# Patient Record
Sex: FEMALE | Race: WHITE | NOT HISPANIC OR LATINO | Employment: FULL TIME | URBAN - METROPOLITAN AREA
[De-identification: names, ages, dates, MRNs, and addresses within clinical notes are randomized per-mention and may not be internally consistent; named-entity substitution may affect disease eponyms.]

---

## 2023-12-19 ENCOUNTER — TELEPHONE (OUTPATIENT)
Dept: OBSTETRICS AND GYNECOLOGY | Facility: CLINIC | Age: 23
End: 2023-12-19
Payer: COMMERCIAL

## 2023-12-19 ENCOUNTER — OFFICE VISIT (OUTPATIENT)
Dept: OBSTETRICS AND GYNECOLOGY | Facility: CLINIC | Age: 23
End: 2023-12-19
Payer: COMMERCIAL

## 2023-12-19 VITALS
HEIGHT: 62 IN | SYSTOLIC BLOOD PRESSURE: 115 MMHG | WEIGHT: 211 LBS | DIASTOLIC BLOOD PRESSURE: 82 MMHG | BODY MASS INDEX: 38.83 KG/M2

## 2023-12-19 DIAGNOSIS — Z72.0 TOBACCO USE: ICD-10-CM

## 2023-12-19 DIAGNOSIS — Z01.419 VISIT FOR GYNECOLOGIC EXAMINATION: ICD-10-CM

## 2023-12-19 DIAGNOSIS — Z31.69 ENCOUNTER FOR PRECONCEPTION CONSULTATION: ICD-10-CM

## 2023-12-19 DIAGNOSIS — N91.4 SECONDARY OLIGOMENORRHEA: Primary | ICD-10-CM

## 2023-12-19 LAB
ALBUMIN SERPL-MCNC: 4.6 G/DL (ref 3.5–5.2)
ALBUMIN/GLOB SERPL: 1.8 G/DL
ALP SERPL-CCNC: 73 U/L (ref 39–117)
ALT SERPL-CCNC: 36 U/L (ref 1–33)
AST SERPL-CCNC: 18 U/L (ref 1–32)
BILIRUB SERPL-MCNC: <0.2 MG/DL (ref 0–1.2)
BUN SERPL-MCNC: 13 MG/DL (ref 6–20)
BUN/CREAT SERPL: 16.7 (ref 7–25)
CALCIUM SERPL-MCNC: 9.9 MG/DL (ref 8.6–10.5)
CHLORIDE SERPL-SCNC: 104 MMOL/L (ref 98–107)
CO2 SERPL-SCNC: 21.8 MMOL/L (ref 22–29)
CREAT SERPL-MCNC: 0.78 MG/DL (ref 0.57–1)
EGFRCR SERPLBLD CKD-EPI 2021: 109.6 ML/MIN/1.73
ERYTHROCYTE [DISTWIDTH] IN BLOOD BY AUTOMATED COUNT: 13 % (ref 12.3–15.4)
GLOBULIN SER CALC-MCNC: 2.6 GM/DL
GLUCOSE SERPL-MCNC: 98 MG/DL (ref 65–99)
HCT VFR BLD AUTO: 44.1 % (ref 34–46.6)
HGB BLD-MCNC: 14.3 G/DL (ref 12–15.9)
MCH RBC QN AUTO: 26.5 PG (ref 26.6–33)
MCHC RBC AUTO-ENTMCNC: 32.4 G/DL (ref 31.5–35.7)
MCV RBC AUTO: 81.7 FL (ref 79–97)
PLATELET # BLD AUTO: 291 10*3/MM3 (ref 140–450)
POTASSIUM SERPL-SCNC: 4.3 MMOL/L (ref 3.5–5.2)
PROT SERPL-MCNC: 7.2 G/DL (ref 6–8.5)
RBC # BLD AUTO: 5.4 10*6/MM3 (ref 3.77–5.28)
SODIUM SERPL-SCNC: 137 MMOL/L (ref 136–145)
TSH SERPL DL<=0.005 MIU/L-ACNC: 1.6 UIU/ML (ref 0.27–4.2)
WBC # BLD AUTO: 8.3 10*3/MM3 (ref 3.4–10.8)

## 2023-12-19 NOTE — TELEPHONE ENCOUNTER
Caller: Irina Moran    Relationship to patient: Self    Best call back number: [phone]    Patient is needing: PT WAS SEEN IN OFFICE TODAY FOR NEW GYN APPT. PT WAS TOLD SHE WOULD NEED TO MAKE A U/S APPT AND FOLLOW UP APPT. PT FORGOT TO SCHEDULE BEFORE LEAVING THE OFFICE.     PER VICKI SEND TE TO OFFICE TO SEE HOW SOON DR. LUCIO WOULD LIKE FOR PATIENT TO COME BACK

## 2023-12-19 NOTE — PROGRESS NOTES
[facility]  [address], [address]  [place], [place] [address]  Phone: [phone] / Fax:  [phone]      12/19/2023    [address] [facility] [address]    Provider, No Known    Chief Complaint   Patient presents with    Gynecologic Exam     NP Annual Exam last pap 3 years, she reports this to be abnormal. Patient is concerned with being able to conceive. She believes she was pregnant last year, she had taken several ucgs that was positive, then began bleeding, by time she made it to the ER for bleeding a ultrasound showed no IUP. Patient declines STD screening.       Irina Moran is here for annual gynecologic exam.  HPI - Patient with pap approximately 3 years ago and reports that it was abnormal but results not available.  She is wanting to conceive and has been trying to do so for 2 years.  She had possible early miscarriage last year; she had home testing that was positive but subsequently developed bleeding and went to ED and no evidence of pregnancy was found.  She does not have regular cycles and reports extended intervals between her periods.    Past Medical History:   Diagnosis Date    Abnormal Pap smear of cervix     Anxiety     Bipolar disorder     Depression     Polycystic ovary syndrome        Past Surgical History:   Procedure Laterality Date    TOE SURGERY Bilateral     Pinky toes- extra bones       No Known Allergies    Social History     Socioeconomic History    Marital status:    Tobacco Use    Smoking status: Former     Types: Cigarettes   Vaping Use    Vaping Use: Every day    Substances: Flavoring    Devices: Disposable   Substance and Sexual Activity    Alcohol use: Not Currently    Drug use: Not Currently     Types: Marijuana    Sexual activity: Yes     Birth control/protection: None       Family History   Problem Relation Age of Onset    No Known Problems Father     Breast cancer Mother     Cervical cancer Mother     Aneurysm Brother     Other Sister     No  "Known Problems Sister     Other Paternal Grandfather     Ovarian cancer Paternal Grandmother     Breast cancer Maternal Grandmother     Lung cancer Maternal Grandmother     Other Maternal Grandfather        Patient's last menstrual period was 2023 (approximate).    OB History          1    Para        Term                AB   1    Living             SAB   1    IAB        Ectopic        Molar        Multiple        Live Births                    Vitals:    23 1104   BP: 115/82   Weight: 95.7 kg (211 lb)   Height: 156.2 cm (61.5\")       Physical Exam  Constitutional:       Appearance: Normal appearance. She is well-developed.   Genitourinary:      Bladder and urethral meatus normal.      Right Labia: No tenderness or lesions.     Left Labia: No tenderness or lesions.     No vaginal discharge or tenderness.        Right Adnexa: not tender and not full.     Left Adnexa: not tender and not full.     No cervical motion tenderness or lesion.      Uterus is not enlarged or tender.      No urethral tenderness or hypermobility present.   Breasts:     Right: No mass or nipple discharge.      Left: No mass or nipple discharge.   HENT:      Right Ear: External ear normal.      Left Ear: External ear normal.      Nose: Nose normal.   Eyes:      Conjunctiva/sclera: Conjunctivae normal.   Neck:      Thyroid: No thyromegaly.   Cardiovascular:      Rate and Rhythm: Normal rate and regular rhythm.      Heart sounds: Normal heart sounds.   Pulmonary:      Effort: Pulmonary effort is normal.      Breath sounds: No stridor. No wheezing.   Abdominal:      Palpations: Abdomen is soft.      Tenderness: There is no abdominal tenderness. There is no guarding or rebound.   Musculoskeletal:         General: Normal range of motion.      Cervical back: Normal range of motion and neck supple.   Neurological:      Mental Status: She is alert.      Coordination: Coordination normal.   Skin:     General: Skin is warm and " dry.   Psychiatric:         Mood and Affect: Mood normal.         Behavior: Behavior normal.         Thought Content: Thought content normal.         Judgment: Judgment normal.   Vitals reviewed. Exam conducted with a chaperone present.         Diagnoses and all orders for this visit:    1. Secondary oligomenorrhea (Primary)  -     Follicle Stimulating Hormone  -     TSH  -     Luteinizing Hormone  -     Prolactin  -     CBC (No Diff)  -     Comprehensive Metabolic Panel  -     Check labs to assess for oligomenorrhea.  Patient to follow up and do ultrasound.    2. Encounter for preconception consultation        -     Discussed optimizing health, taking folate/vitamin, quitting smoking.    3. Tobacco use        -      Discussed importance of quitting smoking and risks of continued smoking.    4. Visit for gynecologic examination  -     IGP, Rfx Aptima HPV ASCU  -     Discussed health maintenance and breast awareness.        Grover Islas MD

## 2023-12-19 NOTE — TELEPHONE ENCOUNTER
Patient called after appt today and wants to know how soon you would like for her to get her US?  Ty

## 2023-12-20 LAB
FSH SERPL-ACNC: 6.2 MIU/ML
LH SERPL-ACNC: 20.6 MIU/ML
PROLACTIN SERPL-MCNC: 6.5 NG/ML (ref 4.8–33.4)

## 2023-12-21 LAB
A VAGINAE DNA VAG QL NAA+PROBE: NORMAL SCORE
BVAB2 DNA VAG QL NAA+PROBE: NORMAL SCORE
C ALBICANS DNA VAG QL NAA+PROBE: NEGATIVE
C GLABRATA DNA VAG QL NAA+PROBE: NEGATIVE
C TRACH DNA VAG QL NAA+PROBE: NEGATIVE
CONV .: NORMAL
CYTOLOGIST CVX/VAG CYTO: NORMAL
CYTOLOGY CVX/VAG DOC CYTO: NORMAL
CYTOLOGY CVX/VAG DOC THIN PREP: NORMAL
DX ICD CODE: NORMAL
HIV 1 & 2 AB SER-IMP: NORMAL
MEGA1 DNA VAG QL NAA+PROBE: NORMAL SCORE
N GONORRHOEA DNA VAG QL NAA+PROBE: NEGATIVE
OTHER STN SPEC: NORMAL
STAT OF ADQ CVX/VAG CYTO-IMP: NORMAL
T VAGINALIS DNA VAG QL NAA+PROBE: NEGATIVE

## 2024-01-23 ENCOUNTER — OFFICE VISIT (OUTPATIENT)
Dept: OBSTETRICS AND GYNECOLOGY | Facility: CLINIC | Age: 24
End: 2024-01-23
Payer: COMMERCIAL

## 2024-01-23 VITALS
SYSTOLIC BLOOD PRESSURE: 111 MMHG | HEIGHT: 61 IN | BODY MASS INDEX: 40.78 KG/M2 | WEIGHT: 216 LBS | DIASTOLIC BLOOD PRESSURE: 76 MMHG

## 2024-01-23 DIAGNOSIS — N91.4 SECONDARY OLIGOMENORRHEA: Primary | ICD-10-CM

## 2024-01-23 PROCEDURE — 99213 OFFICE O/P EST LOW 20 MIN: CPT | Performed by: OBSTETRICS & GYNECOLOGY

## 2024-01-23 NOTE — PROGRESS NOTES
Subjective   Irina Moran is a 23 y.o. female.     Cc:  Conception, intermittent periods    History of Present Illness - Patient is a 23 year old nulliparous female who presents for follow up.  She has sporadic cycles but last menses was within past 2 weeks.  She underwent Prolactin, TSH, FSH, LH and ultrasound that all were normal.  Her partner has not been tested for fertility and she has never tried medication.    The following portions of the patient's history were reviewed and updated as appropriate: She  has a past medical history of Abnormal Pap smear of cervix, Anxiety, Bipolar disorder, Depression, and Polycystic ovary syndrome.  She  reports that she has quit smoking. Her smoking use included cigarettes. She does not have any smokeless tobacco history on file. She reports that she does not currently use alcohol. She reports that she does not currently use drugs after having used the following drugs: Marijuana.  Current Outpatient Medications   Medication Sig Dispense Refill    metFORMIN (GLUCOPHAGE) 500 MG tablet Take 1 tablet by mouth 2 (Two) Times a Day With Meals.      sertraline (ZOLOFT) 50 MG tablet 1 tablet Daily.       No current facility-administered medications for this visit.     She has No Known Allergies..    Review of Systems   Constitutional:  Negative for chills and fever.   Genitourinary:  Positive for menstrual problem.       Objective   Physical Exam  Vitals reviewed.   Constitutional:       Appearance: Normal appearance.   Neurological:      Mental Status: She is alert.   Psychiatric:         Mood and Affect: Mood normal.         Behavior: Behavior normal.         Thought Content: Thought content normal.         Judgment: Judgment normal.         Assessment & Plan   Diagnoses and all orders for this visit:    1. Secondary oligomenorrhea (Primary)  - Recommend fertility evaluation.  Patient to start prenatal vitamin.  Consider male factors.  Questions answered.    Grover Islas MD

## 2024-01-26 ENCOUNTER — TELEPHONE (OUTPATIENT)
Dept: OBSTETRICS AND GYNECOLOGY | Facility: CLINIC | Age: 24
End: 2024-01-26

## 2024-01-26 NOTE — TELEPHONE ENCOUNTER
PROVIDER: DR. LUCIO    Caller: Irina Moran    Relationship: Self    Best call back number: [phone] ANYTIME, CAN LVM IF NA    What is the medical concern/diagnosis: FERTILITY    What specialty or service is being requested: FERTILITY    What is the provider, practice or medical service name: DR. FLORES @ [facility]    What is the office location: [address] [place] [address] [address] [place] [address]    What is the office phone number: [phone]    Any additional details: PT WAS IN ON 1-23 DISCUSSED ISSUE WITH DR. LUCIO, STATES SHE NEEDS A REFERRAL TO [facility]

## 2024-03-01 ENCOUNTER — TELEPHONE (OUTPATIENT)
Dept: OBSTETRICS AND GYNECOLOGY | Facility: CLINIC | Age: 24
End: 2024-03-01
Payer: COMMERCIAL

## 2024-03-01 NOTE — TELEPHONE ENCOUNTER
I tried calling the patient to discuss. However, I do not see where she had a DOS on 2-8-24 with Dr. Islas, her last visit with him was 1-23-24. She had a visit on 2-7-24 ([facility]) and 2-9-24 ( [facility]), neither of these are visits were with Dr. Islas. She may contact the number below to discuss.    Billing office number is [phone].      Thanks,   Paola

## 2024-03-01 NOTE — TELEPHONE ENCOUNTER
Caller: Irina Moran    Relationship: Self    Best call back number: [phone]    What does billing need from the patient: PT RECEIVED A BILL FOR DOS 2/8/24. PT IS ASKING FOR A CALL BACK TO GO OVER BILL. PT STATES THERE ISN'T A BILLING NUMBER ON BILL TO CALL FOR ADDITIONAL QUESTIONS. ASKING FOR CALL BACK OFFICE

## 2024-04-24 ENCOUNTER — OFFICE VISIT (OUTPATIENT)
Dept: CARDIOLOGY | Facility: CLINIC | Age: 24
End: 2024-04-24
Payer: COMMERCIAL

## 2024-04-24 VITALS
WEIGHT: 213.6 LBS | DIASTOLIC BLOOD PRESSURE: 80 MMHG | HEART RATE: 69 BPM | BODY MASS INDEX: 39.31 KG/M2 | HEIGHT: 62 IN | SYSTOLIC BLOOD PRESSURE: 138 MMHG

## 2024-04-24 DIAGNOSIS — I10 PRIMARY HYPERTENSION: Primary | ICD-10-CM

## 2024-04-25 NOTE — PROGRESS NOTES
"      CARDIOLOGY    Nasir Paniagua MD    ENCOUNTER DATE:  04/24/2024    Irina Moran / 24 y.o. / female        CHIEF COMPLAINT / REASON FOR OFFICE VISIT     Dizziness  Hypertension    HISTORY OF PRESENT ILLNESS       Dizziness    Irina Moran is a 24 y.o. female who presents today for consultation.  Patient's been having episodes of intermittent dizziness and just feeling poorly.  Patient says that when she is sleeping or resting she will feel her heart skip.  She is getting short of breath both at rest and with exertion.  She says at times she feels bad feels dizzy feels some pain in her head.  During these episodes her blood pressure is 154 systolically.  When her blood pressure gets this high she also gets tired and nauseated with it.  Patient has gained 70 pounds in the past 3 years.  She said in the past year she is snored very extensively.      The following portions of the patient's history were reviewed and updated as appropriate: allergies, current medications, past family history, past medical history, past social history, past surgical history and problem list.      VITAL SIGNS     Visit Vitals  /80 (BP Location: Left arm)   Pulse 69   Ht 156.5 cm (61.6\")   Wt 96.9 kg (213 lb 9.6 oz)   BMI 39.58 kg/m²         Wt Readings from Last 3 Encounters:   04/24/24 96.9 kg (213 lb 9.6 oz)   02/09/24 98 kg (216 lb)   01/23/24 98 kg (216 lb)     Body mass index is 39.58 kg/m².      REVIEW OF SYSTEMS   Review of Systems   Neurological:  Positive for dizziness.           PHYSICAL EXAMINATION     Vitals reviewed.   Constitutional:       Appearance: Healthy appearance.   Pulmonary:      Effort: Pulmonary effort is normal.   Cardiovascular:      Normal rate. Regular rhythm. Normal S1. Normal S2.       Murmurs: There is no murmur.      No gallop.  No click. No rub.   Pulses:     Intact distal pulses.   Edema:     Peripheral edema absent.   Neurological:      Mental Status: Alert.         REVIEWED DATA "       ECG 12 Lead    Date/Time: 4/24/2024 9:14 AM  Performed by: Nasir Paniagua MD    Authorized by: Nasir Paniagua MD  Comparison: compared with previous ECG from 4/10/2024  Similar to previous ECG  Rhythm: sinus rhythm    Clinical impression: normal ECG        Cardiac Procedures:            ASSESSMENT & PLAN      Diagnosis Plan   1. Primary hypertension  Ambulatory Referral to Sleep Medicine            SUMMARY/DISCUSSION  Hypertension.  Patient blood pressure goes up in the 150s and she feels poorly.  I believe this is because she is a young female and her blood pressure should usually run about 1 10-1 15 so this blood pressure for her is very high.  She said that before her rapid weight gain she would run in this range.  She is also snoring and describes sleep apnea quite classically.  At this point the best thing for her to do is to make lifestyle changes.  1 she needs to avoid salt intake to she needs to start trying to exercise and lose weight and 3 I am going to refer her to the sleep clinic for potential treatment of sleep apnea which could be the underlying cause of why she is feeling so poorly.  Will reevaluate in 6 weeks and go from there.  Patient does vape clearly this is not helping the situation.        MEDICATIONS         Discharge Medications            Accurate as of April 24, 2024 11:59 PM. If you have any questions, ask your nurse or doctor.                Continue These Medications        Instructions Start Date   azithromycin 250 MG tablet  Commonly known as: Zithromax Z-Esteban   Take 2 tablets by mouth on day 1, then 1 tablet daily on days 2-5      fluticasone 50 MCG/ACT nasal spray  Commonly known as: FLONASE   2 sprays, Nasal, Daily      metFORMIN 500 MG tablet  Commonly known as: GLUCOPHAGE   500 mg, Oral, 2 Times Daily With Meals      sertraline 50 MG tablet  Commonly known as: ZOLOFT   1 tablet, Daily             Stop These Medications      methylPREDNISolone 4 MG dose  pack  Commonly known as: MEDROL  Stopped by: Nasir Paniagua MD                  **Dragon Disclaimer:   Much of this encounter note is an electronic transcription/translation of spoken language to printed text. The electronic translation of spoken language may permit erroneous, or at times, nonsensical words or phrases to be inadvertently transcribed. Although I have reviewed the note for such errors, some may still exist.

## 2024-07-17 ENCOUNTER — TELEPHONE (OUTPATIENT)
Dept: CARDIOLOGY | Facility: CLINIC | Age: 24
End: 2024-07-17
Payer: COMMERCIAL

## 2024-07-17 ENCOUNTER — OFFICE VISIT (OUTPATIENT)
Dept: CARDIOLOGY | Facility: CLINIC | Age: 24
End: 2024-07-17
Payer: COMMERCIAL

## 2024-07-17 VITALS
HEIGHT: 62 IN | DIASTOLIC BLOOD PRESSURE: 90 MMHG | SYSTOLIC BLOOD PRESSURE: 130 MMHG | BODY MASS INDEX: 40.48 KG/M2 | HEART RATE: 89 BPM | OXYGEN SATURATION: 97 % | WEIGHT: 220 LBS

## 2024-07-17 DIAGNOSIS — I10 ESSENTIAL HYPERTENSION: Primary | ICD-10-CM

## 2024-07-17 RX ORDER — LABETALOL 100 MG/1
50 TABLET, FILM COATED ORAL 2 TIMES DAILY
Start: 2024-07-17

## 2024-07-17 NOTE — PROGRESS NOTES
"      CARDIOLOGY    Nasir Paniagua MD    ENCOUNTER DATE:  07/17/2024    Irina Moran / 24 y.o. / female        CHIEF COMPLAINT / REASON FOR OFFICE VISIT     Primary hypertension (04/24/2024 Follow up)      HISTORY OF PRESENT ILLNESS       HPI  Irina Moran is a 24 y.o. female who presents today for reevaluation.  Patient unfortunately still felt poorly.  Her PMD put her on labetalol 100 mg twice daily.  Her weight is up a little bit although she says overall she is feeling better.  She is not feeling her heart pounding as bad and her blood pressure has improved.  She denies any types of chest pain shortness of breath palpitations lightheadedness swelling or fatigue.      The following portions of the patient's history were reviewed and updated as appropriate: allergies, current medications, past family history, past medical history, past social history, past surgical history and problem list.      VITAL SIGNS     Visit Vitals  /90 (BP Location: Left arm)   Pulse 89   Ht 156.5 cm (61.6\")   Wt 99.8 kg (220 lb)   SpO2 97%   BMI 40.76 kg/m²         Wt Readings from Last 3 Encounters:   07/17/24 99.8 kg (220 lb)   04/24/24 96.9 kg (213 lb 9.6 oz)   02/09/24 98 kg (216 lb)     Body mass index is 40.76 kg/m².      REVIEW OF SYSTEMS   ROS        PHYSICAL EXAMINATION     Vitals reviewed.   Constitutional:       Appearance: Healthy appearance.   Pulmonary:      Effort: Pulmonary effort is normal.   Cardiovascular:      Normal rate. Regular rhythm. Normal S1. Normal S2.       Murmurs: There is no murmur.      No gallop.  No click. No rub.   Pulses:     Intact distal pulses.   Edema:     Peripheral edema absent.   Neurological:      Mental Status: Alert.           REVIEWED DATA     Procedures    Cardiac Procedures:            ASSESSMENT & PLAN      Diagnosis Plan   1. Essential hypertension              SUMMARY/DISCUSSION  Hypertension.  Patient's blood pressure is better controlled and clinically she is doing much " better.  Unfortunately she had a rapid weight gain and I think it may get her lose some weight it would really significantly help her.  I am concerned about the labetalol because it frequently put weight on people.  I told her to decrease it down to 50 twice daily follow her blood pressure and see how she does.  If her blood pressure does not improve and she feels a pounding again then she will have to go back up to the 100 twice daily.  She was set up for sleep study but it got postponed.  Will see how she does and follow back up with her in 3 months.  Clearly she is making some progress.        MEDICATIONS         Discharge Medications            Accurate as of July 17, 2024  3:13 PM. If you have any questions, ask your nurse or doctor.                New Medications        Instructions Start Date   labetalol 100 MG tablet  Commonly known as: NORMODYNE  Started by: Nicolasa Lang RN   50 mg, Oral, 2 Times Daily             Continue These Medications        Instructions Start Date   metFORMIN 500 MG tablet  Commonly known as: GLUCOPHAGE   500 mg, Oral, 2 Times Daily With Meals      sertraline 50 MG tablet  Commonly known as: ZOLOFT   1 tablet, Daily                   **Dragon Disclaimer:   Much of this encounter note is an electronic transcription/translation of spoken language to printed text. The electronic translation of spoken language may permit erroneous, or at times, nonsensical words or phrases to be inadvertently transcribed. Although I have reviewed the note for such errors, some may still exist.

## 2024-07-17 NOTE — TELEPHONE ENCOUNTER
Patient said you requested for her to call and let you know what BP med she is taking.    Patient reports she is taking labetalol 100 mg BID    I can add that to her list if you want?  Not sure if you are planning to add or change anything.     Just let me know and I can call her back.    Nicolasa Lang RN  [facility] Triage  07/17/24 10:54 EDT

## 2024-07-17 NOTE — ADDENDUM NOTE
Addended by: CHACORTA SIMS on: 7/17/2024 01:36 PM     Modules accepted: Orders     Don't start fosamax until her dentist gives her the clear. She does not need to hold anything.

## 2024-07-17 NOTE — TELEPHONE ENCOUNTER
Attempted to call Irina Moran, no answer.  Left a voicemail for patient to call back and ask to speak with the triage nurses.  Will continue to try to reach patient.    Triage: please pass along to patient that Dr. Paniagua wants her on 0.5 tablet of labetolol BID.  I have already updated the medlist.    Nicolasa Lang RN  [facility] Triage  07/17/24 13:14 EDT

## 2024-07-18 NOTE — TELEPHONE ENCOUNTER
I was able to reach patient and have her decrease her labetalol to 50 mg BID, which means she will take half a pill twice daily.  She verbalizes understanding and agrees with plan.    Nicolasa Lang RN  [facility] Triage  07/18/24 10:44 EDT

## 2024-08-21 ENCOUNTER — OFFICE VISIT (OUTPATIENT)
Dept: SLEEP MEDICINE | Facility: HOSPITAL | Age: 24
End: 2024-08-21
Payer: COMMERCIAL

## 2024-08-21 VITALS — WEIGHT: 213 LBS | BODY MASS INDEX: 39.2 KG/M2 | HEART RATE: 68 BPM | OXYGEN SATURATION: 94 % | HEIGHT: 62 IN

## 2024-08-21 DIAGNOSIS — G47.10 HYPERSOMNIA: ICD-10-CM

## 2024-08-21 DIAGNOSIS — E66.01 MORBID OBESITY: ICD-10-CM

## 2024-08-21 DIAGNOSIS — G47.8 NON-RESTORATIVE SLEEP: ICD-10-CM

## 2024-08-21 DIAGNOSIS — G47.63 SLEEP-RELATED BRUXISM: ICD-10-CM

## 2024-08-21 DIAGNOSIS — R06.83 SNORING: ICD-10-CM

## 2024-08-21 DIAGNOSIS — G47.30 SLEEP APNEA, UNSPECIFIED TYPE: Primary | ICD-10-CM

## 2024-08-21 PROCEDURE — 99204 OFFICE O/P NEW MOD 45 MIN: CPT | Performed by: FAMILY MEDICINE

## 2024-08-21 PROCEDURE — G0463 HOSPITAL OUTPT CLINIC VISIT: HCPCS
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
## 2024-09-03 ENCOUNTER — HOSPITAL ENCOUNTER (OUTPATIENT)
Dept: SLEEP MEDICINE | Facility: HOSPITAL | Age: 24
Discharge: HOME | End: 2024-09-03
Admitting: FAMILY MEDICINE
Payer: COMMERCIAL

## 2024-09-03 DIAGNOSIS — G47.63 SLEEP-RELATED BRUXISM: ICD-10-CM

## 2024-09-03 DIAGNOSIS — E66.01 MORBID OBESITY: ICD-10-CM

## 2024-09-03 DIAGNOSIS — G47.8 NON-RESTORATIVE SLEEP: ICD-10-CM

## 2024-09-03 DIAGNOSIS — G47.10 HYPERSOMNIA: ICD-10-CM

## 2024-09-03 DIAGNOSIS — R06.83 SNORING: ICD-10-CM

## 2024-09-03 DIAGNOSIS — G47.30 SLEEP APNEA, UNSPECIFIED TYPE: ICD-10-CM

## 2024-09-03 PROCEDURE — 95806 SLEEP STUDY UNATT&RESP EFFT: CPT

## 2024-09-06 ENCOUNTER — TELEPHONE (OUTPATIENT)
Dept: SLEEP MEDICINE | Facility: HOSPITAL | Age: 24
End: 2024-09-06
Payer: COMMERCIAL

## 2024-09-06 PROCEDURE — 95806 SLEEP STUDY UNATT&RESP EFFT: CPT | Performed by: FAMILY MEDICINE

## 2024-09-27 ENCOUNTER — OFFICE VISIT (OUTPATIENT)
Dept: SLEEP MEDICINE | Facility: HOSPITAL | Age: 24
End: 2024-09-27
Payer: COMMERCIAL

## 2024-09-27 VITALS — WEIGHT: 215 LBS | OXYGEN SATURATION: 98 % | BODY MASS INDEX: 40.59 KG/M2 | HEIGHT: 61 IN | HEART RATE: 70 BPM

## 2024-09-27 DIAGNOSIS — G47.33 OBSTRUCTIVE SLEEP APNEA: Primary | ICD-10-CM

## 2024-09-27 DIAGNOSIS — E66.01 MORBID OBESITY: ICD-10-CM

## 2024-09-27 PROCEDURE — G0463 HOSPITAL OUTPT CLINIC VISIT: HCPCS

## 2024-09-30 ENCOUNTER — TELEPHONE (OUTPATIENT)
Dept: SLEEP MEDICINE | Facility: HOSPITAL | Age: 24
End: 2024-09-30
Payer: COMMERCIAL

## 2024-12-13 ENCOUNTER — TELEPHONE (OUTPATIENT)
Dept: SLEEP MEDICINE | Facility: HOSPITAL | Age: 24
End: 2024-12-13
Payer: COMMERCIAL

## 2024-12-13 ENCOUNTER — OFFICE VISIT (OUTPATIENT)
Dept: SLEEP MEDICINE | Facility: HOSPITAL | Age: 24
End: 2024-12-13
Payer: COMMERCIAL

## 2024-12-13 VITALS — WEIGHT: 220 LBS | HEIGHT: 60 IN | BODY MASS INDEX: 43.19 KG/M2 | OXYGEN SATURATION: 97 % | HEART RATE: 94 BPM

## 2024-12-13 DIAGNOSIS — G47.33 OBSTRUCTIVE SLEEP APNEA: Primary | ICD-10-CM

## 2024-12-13 DIAGNOSIS — E66.01 MORBID OBESITY: ICD-10-CM

## 2024-12-13 PROCEDURE — G0463 HOSPITAL OUTPT CLINIC VISIT: HCPCS

## 2024-12-13 NOTE — PROGRESS NOTES
"Follow Up [facility] Note     Chief Complaint:  MALIA     Primary Care Physician: Shira Maloney APRN    Interval History:   The patient is a 24 y.o. female  who was last seen in the sleep lab: 9/27/2024.  September 2024 HST showed AHI 5.9 lowest SpO2 84% average SpO2 96%.  At last visit we discussed results and treatment options.  Patient was amenable to trying auto CPAP 8-16 cm H2O.  Presents today for first follow-up since starting PAP.  Feels like is not getting enough air.    Downloaded PAP Data Reviewed For Compliance:  DME is [facility] [place].  Downloads between 11/12/2024 - 12/11/2024.  Average usage is 4 hours 3 minutes.  Average AHI is 0.7.  Average auto CPAP pressure is 9.2 cm H2O    I have reviewed the above results and compared them with the patient's last downloads and reviewed with the patient.    Review of Systems:    A complete review of systems was done and all were negative with the exception of see scanned media    Social History:    Social History     Socioeconomic History    Marital status:    Tobacco Use    Smoking status: Every Day     Passive exposure: Past    Smokeless tobacco: Never   Vaping Use    Vaping status: Every Day    Substances: Flavoring    Devices: Disposable    Passive vaping exposure: Yes   Substance and Sexual Activity    Alcohol use: Not Currently    Drug use: Not Currently     Types: Marijuana    Sexual activity: Yes     Birth control/protection: None       Allergies:  Patient has no known allergies.     Medication Review:  Reviewed.      Vital Signs:    Vitals:    12/13/24 1104   Pulse: 94   SpO2: 97%   Weight: 99.8 kg (220 lb)   Height: 152.4 cm (60\")     Body mass index is 42.97 kg/m².    Physical Exam:    Constitutional:  Well developed 24 y.o. female that appears in no apparent distress.  Awake & oriented times 3.  Normal mood with normal recent and remote memory and normal judgement.  Eyes:  Conjunctivae normal.  Oropharynx: Previously, moist mucous " membranes.    Self-administered [place] Sleepiness Scale test results: See scanned media  0-5 Lower normal daytime sleepiness  6-10 Higher normal daytime sleepiness  11-12 Mild, 13-15 Moderate, & 16-24 Severe excessive daytime sleepiness    Impression:   1. Obstructive sleep apnea    2. Morbid obesity        Obstructive sleep apnea adequately treated with auto CPAP 8-16 cm H2O. The patient appears to be at goal with good compliance and usage.  Pressure comfort issues.    Plan:  Good sleep hygiene measures should be maintained.  Weight loss would be beneficial in this patient who is morbidly obese by Body mass index is 42.97 kg/m²..      After evaluating the patient and assessing results available, the patient is benefiting from the treatment being provided.     The patient will continue CPAP.  After clinical evaluation and review of downloads, I recommend a ramp start pressure to 9 cm H2O change pressure settings to 11-15 cm H2O.  A new prescription will be sent to the patient's DME.    Caution during activities that require prolonged concentration is strongly advised if sleepiness returns. Changing of PAP supplies regularly is important for effective use. Patient needs to change cushion on the mask or plugs on nasal pillows along with disposable filters once every month and change mask frame, tubing, headgear and Velcro straps every 6 months at the minimum.    I answered all of the patient's questions.  The patient will call for any problems and will follow up in 2 months.      Fadumo Perez MD  Sleep Medicine  12/13/24  11:21 EST

## 2024-12-20 ENCOUNTER — TELEPHONE (OUTPATIENT)
Dept: SLEEP MEDICINE | Facility: HOSPITAL | Age: 24
End: 2024-12-20
Payer: COMMERCIAL

## 2024-12-20 DIAGNOSIS — G47.33 OBSTRUCTIVE SLEEP APNEA: Primary | ICD-10-CM

## 2024-12-20 NOTE — TELEPHONE ENCOUNTER
Patient called and stated she is having difficulty breathing against the pressure.  Dr. Perez had me set the ramp to 8cm and then switched from auto to a set pressure of 9cm.  Change sent to device and patient notified.

## 2025-01-08 ENCOUNTER — OFFICE VISIT (OUTPATIENT)
Dept: CARDIOLOGY | Facility: CLINIC | Age: 25
End: 2025-01-08
Payer: COMMERCIAL

## 2025-01-08 VITALS
WEIGHT: 224 LBS | BODY MASS INDEX: 43.98 KG/M2 | OXYGEN SATURATION: 97 % | HEIGHT: 60 IN | SYSTOLIC BLOOD PRESSURE: 136 MMHG | DIASTOLIC BLOOD PRESSURE: 80 MMHG | HEART RATE: 65 BPM

## 2025-01-08 DIAGNOSIS — I10 ESSENTIAL HYPERTENSION: Primary | ICD-10-CM

## 2025-01-08 PROCEDURE — 99213 OFFICE O/P EST LOW 20 MIN: CPT | Performed by: INTERNAL MEDICINE

## 2025-01-08 NOTE — PROGRESS NOTES
"      CARDIOLOGY    Nasir Paniagua MD    ENCOUNTER DATE:  01/08/2025    Irina Moran / 24 y.o. / female        CHIEF COMPLAINT / REASON FOR OFFICE VISIT     Essential hypertension (07/17/2024 Follow up)      HISTORY OF PRESENT ILLNESS       HPI  Irina Moran is a 24 y.o. female who presents today for reevaluation.  Her blood pressure is somewhat better today at 136/80.  This is about where she says she is running at home.  She is on the lower dose of the propranolol.  She says she still has some palpitations when she first lays down at night.  Not having them any other times.  Patient has some sharp chest discomforts.  She does have sleep apnea she is currently being treated for sleep apnea.  She said the first week that she had her sleep machine she felt really good and well rested.  Since that time however she says she feels more tired than she was.  Unfortunately her weight continues to go up.      The following portions of the patient's history were reviewed and updated as appropriate: allergies, current medications, past family history, past medical history, past social history, past surgical history and problem list.      VITAL SIGNS     Visit Vitals  /80 (BP Location: Left arm)   Pulse 65   Ht 152.4 cm (60\")   Wt 102 kg (224 lb)   SpO2 97%   BMI 43.75 kg/m²         Wt Readings from Last 3 Encounters:   01/08/25 102 kg (224 lb)   12/13/24 99.8 kg (220 lb)   10/09/24 97.5 kg (215 lb)     Body mass index is 43.75 kg/m².      REVIEW OF SYSTEMS   ROS        PHYSICAL EXAMINATION     Vitals reviewed.   Constitutional:       Appearance: Healthy appearance.   Pulmonary:      Effort: Pulmonary effort is normal.   Cardiovascular:      Normal rate. Regular rhythm. Normal S1. Normal S2.       Murmurs: There is no murmur.      No gallop.  No click. No rub.   Pulses:     Intact distal pulses.   Edema:     Peripheral edema absent.   Neurological:      Mental Status: Alert.           REVIEWED DATA "     Procedures    Cardiac Procedures:            ASSESSMENT & PLAN      Diagnosis Plan   1. Essential hypertension              SUMMARY/DISCUSSION  Hypertension overall her blood pressure is fine I would continue the same.  Patient unfortunately has gained weight which will obviously add to her blood pressure issues.  She is trying to work on getting some of her weight off.  Sleep apnea she did test positive.  She said the first week of her sleep now she felt great but since that time is just not been feeling very good.  She does have a follow-up appointment with the sleep clinic in February.  At this point I would continue the same.  Patient was told to follow-up in 6 months sooner if issues occur.        MEDICATIONS         Discharge Medications            Accurate as of January 8, 2025  2:21 PM. If you have any questions, ask your nurse or doctor.                Continue These Medications        Instructions Start Date   albuterol sulfate  (90 Base) MCG/ACT inhaler  Commonly known as: PROVENTIL HFA;VENTOLIN HFA;PROAIR HFA   1-2 puffs      albuterol sulfate  (90 Base) MCG/ACT inhaler  Commonly known as: PROVENTIL HFA;VENTOLIN HFA;PROAIR HFA   INHALE 2 PUFFS EVERY 4 HOURS AS NEEDED WHEEZING      azithromycin 250 MG tablet  Commonly known as: ZITHROMAX   Take 2 tabs on first day.  Take 1 tab a day for remaining 4 days.      benzonatate 200 MG capsule  Commonly known as: TESSALON   200 mg, Oral, 3 Times Daily PRN      labetalol 100 MG tablet  Commonly known as: NORMODYNE   50 mg, Oral, 2 Times Daily      metFORMIN 500 MG tablet  Commonly known as: GLUCOPHAGE   500 mg, 2 Times Daily With Meals      sertraline 50 MG tablet  Commonly known as: ZOLOFT   1 tablet, Daily                   **Dragon Disclaimer:   Much of this encounter note is an electronic transcription/translation of spoken language to printed text. The electronic translation of spoken language may permit erroneous, or at times, nonsensical  words or phrases to be inadvertently transcribed. Although I have reviewed the note for such errors, some may still exist.

## 2025-04-01 ENCOUNTER — OFFICE VISIT (OUTPATIENT)
Facility: HOSPITAL | Age: 25
End: 2025-04-01
Payer: COMMERCIAL

## 2025-04-01 VITALS — BODY MASS INDEX: 44.52 KG/M2 | WEIGHT: 226.8 LBS | HEIGHT: 60 IN | HEART RATE: 83 BPM | OXYGEN SATURATION: 98 %

## 2025-04-01 DIAGNOSIS — G47.33 OBSTRUCTIVE SLEEP APNEA: Primary | ICD-10-CM

## 2025-04-01 DIAGNOSIS — E66.01 MORBID OBESITY: ICD-10-CM

## 2025-04-01 PROCEDURE — G0463 HOSPITAL OUTPT CLINIC VISIT: HCPCS

## 2025-04-01 NOTE — PROGRESS NOTES
"Follow Up [facility] Note     Chief Complaint:  MALIA     Primary Care Physician: Shira Maloney APRN    Interval History:   The patient is a 25 y.o. female  who was last seen in the sleep lab: 12/13/2024.  2024 HST showed AHI 5.9 lowest SpO2 84%.  Patient was amenable to trying auto CPAP.  At first follow-up for like she was not getting enough air.  A change pressure to 11-15 cm H2O and increased ramp start pressure to 9 cm H2O.  I then lowered the ramp to 8 and change her overall pressure settings to a set pressure of 9 cm H2O if patient felt like air was too much.  No follow-up since then.    Downloaded PAP Data Reviewed For Compliance:  DME is [facility].  Downloads between 3/2/2025 - 3/31/2025.  Average usage is 4 hours 43 minutes.  Average AHI is 1.6.  Average auto CPAP pressure is 9 cm H2O    I have reviewed the above results and compared them with the patient's last downloads and reviewed with the patient.    Review of Systems:    A complete review of systems was done and all were negative with the exception of shortness of breath    Social History:    Social History     Socioeconomic History    Marital status:    Tobacco Use    Smoking status: Every Day     Passive exposure: Past    Smokeless tobacco: Never   Vaping Use    Vaping status: Every Day    Substances: Flavoring    Devices: Disposable    Passive vaping exposure: Yes   Substance and Sexual Activity    Alcohol use: Not Currently    Drug use: Not Currently     Types: Marijuana    Sexual activity: Yes     Birth control/protection: None       Allergies:  Patient has no known allergies.     Medication Review:  Reviewed.      Vital Signs:    Vitals:    04/01/25 1411   Pulse: 83   SpO2: 98%   Weight: 103 kg (226 lb 12.8 oz)   Height: 152.4 cm (60\")     Body mass index is 44.29 kg/m².    Physical Exam:    Constitutional:  Well developed 25 y.o. female that appears in no apparent distress.  Awake & oriented times 3.  Normal mood with normal " recent and remote memory and normal judgement.  Eyes:  Conjunctivae normal.  Oropharynx: Previously, moist mucous membranes.    Self-administered [place] Sleepiness Scale test results: 16  0-5 Lower normal daytime sleepiness  6-10 Higher normal daytime sleepiness  11-12 Mild, 13-15 Moderate, & 16-24 Severe excessive daytime sleepiness    Impression:   1. Obstructive sleep apnea    2. Morbid obesity        Obstructive sleep apnea adequately treated with CPAP 9 cm H2O. The patient appears to be at goal with good compliance and usage.  One of our techs will call her supplier today as they sent her the wrong supplies which results in air leak and discomfort during sleep and they are not answering her calls.    Plan:  Good sleep hygiene measures should be maintained.  Weight loss would be beneficial in this patient who morbidly obese by Body mass index is 44.29 kg/m²..      After evaluating the patient and assessing results available, the patient is benefiting from the treatment being provided.     The patient will continue CPAP.  After clinical evaluation and review of downloads, I recommend no changes to the patient's pressures.  A new prescription will be sent to the patient's DME.    Caution during activities that require prolonged concentration is strongly advised if sleepiness returns. Changing of PAP supplies regularly is important for effective use. Patient needs to change cushion on the mask or plugs on nasal pillows along with disposable filters once every month and change mask frame, tubing, headgear and Velcro straps every 6 months at the minimum.    I answered all of the patient's questions.  The patient will call for any problems and will follow up in 1 year.      Fadumo Perez MD  Sleep Medicine  04/01/25  14:40 EDT

## 2025-04-21 ENCOUNTER — TELEPHONE (OUTPATIENT)
Dept: OBSTETRICS AND GYNECOLOGY | Facility: CLINIC | Age: 25
End: 2025-04-21

## 2025-04-21 NOTE — TELEPHONE ENCOUNTER
Caller: Irina Moran    Relationship:  Self    Best call back number: [phone]      PATIENT CALLED REQUESTING TO CANCEL SAME DAY APPT.    Did the patient call AFTER the start time of their scheduled appointment?  []YES  [x]NO    Was the patient's appointment rescheduled? [x]YES  []NO

## 2025-06-06 ENCOUNTER — TELEPHONE (OUTPATIENT)
Dept: OBSTETRICS AND GYNECOLOGY | Facility: CLINIC | Age: 25
End: 2025-06-06
Payer: COMMERCIAL

## 2025-06-06 NOTE — TELEPHONE ENCOUNTER
1/23/24 Secondary oligomenorrhea. Wants to test for endometriosis and uterine cancer. States you referred her to fertility and failed IUI. Can not afford IVF. She has PCOS and cysts on her fibroids. She she come in for AE and have US or AE then evaluate needs? Thank you

## 2025-06-06 NOTE — TELEPHONE ENCOUNTER
Caller: Irina Moran    Relationship to patient: Self    Best call back number: [phone]        Patient is needing: PATIENT IS WANTING TO SPEAK TO DR LUCIO ABOUT TESTING FOR ENDOMITOSIS AND UTERINE CANCER. PATIENT STATES HE REFERRED HER OUT TO FERTILITY AND FAILED IUI AND CAN NOT AFFORD IVF.     PATIENT STATES SHE DOES PCOS AND CYST ON HER OVARIES.